# Patient Record
Sex: FEMALE | Race: WHITE
[De-identification: names, ages, dates, MRNs, and addresses within clinical notes are randomized per-mention and may not be internally consistent; named-entity substitution may affect disease eponyms.]

---

## 2017-11-06 NOTE — MMO
BILATERAL SCREENING MAMMOGRAM:

 

Date:  11/06/17 

 

INDICATION:

Baseline exam. 

 

COMPARISON:  

None. 

 

FINDINGS:

 

Interpretation of this exam was assisted with computer-aided detection.  

 

There are scattered fibroglandular elements bilaterally. There is an intramammary lymph node seen wit
hin the outer aspect of the right breast. There are scattered benign-appearing calcifications bilater
ally. 

 

No suspicious mass, cluster of microcalcifications, or area of architectural distortion is evident. 

 

IMPRESSION: 

 

BIRADS 2:  Benign Finding(s)

 

Recommend routine annual mammographic screening.  

 

POS: ANSELMO

## 2021-11-29 ENCOUNTER — HOSPITAL ENCOUNTER (OUTPATIENT)
Dept: HOSPITAL 92 - BICMAMMO | Age: 56
Discharge: HOME | End: 2021-11-29
Attending: FAMILY MEDICINE
Payer: COMMERCIAL

## 2021-11-29 DIAGNOSIS — Z12.31: Primary | ICD-10-CM

## 2021-11-29 PROCEDURE — 77067 SCR MAMMO BI INCL CAD: CPT

## 2021-11-29 PROCEDURE — 77063 BREAST TOMOSYNTHESIS BI: CPT
